# Patient Record
Sex: FEMALE | Race: WHITE | Employment: FULL TIME | URBAN - NONMETROPOLITAN AREA
[De-identification: names, ages, dates, MRNs, and addresses within clinical notes are randomized per-mention and may not be internally consistent; named-entity substitution may affect disease eponyms.]

---

## 2018-01-01 ENCOUNTER — NURSE TRIAGE (OUTPATIENT)
Dept: ADMINISTRATIVE | Age: 23
End: 2018-01-01

## 2019-01-27 ENCOUNTER — HOSPITAL ENCOUNTER (EMERGENCY)
Age: 24
Discharge: HOME | End: 2019-01-27
Attending: EMERGENCY MEDICINE
Payer: COMMERCIAL

## 2019-01-27 VITALS
HEIGHT: 64 IN | SYSTOLIC BLOOD PRESSURE: 90 MMHG | RESPIRATION RATE: 15 BRPM | HEART RATE: 78 BPM | BODY MASS INDEX: 19.63 KG/M2 | TEMPERATURE: 97.9 F | DIASTOLIC BLOOD PRESSURE: 70 MMHG | WEIGHT: 115 LBS | OXYGEN SATURATION: 98 %

## 2019-01-27 DIAGNOSIS — R68.89 FEELING OF BEING DRUGGED: Primary | ICD-10-CM

## 2019-01-27 PROCEDURE — 2709999900 HC NON-CHARGEABLE SUPPLY

## 2019-01-27 PROCEDURE — 99283 EMERGENCY DEPT VISIT LOW MDM: CPT

## 2019-01-27 ASSESSMENT — ENCOUNTER SYMPTOMS
BACK PAIN: 0
EYE DISCHARGE: 0
RHINORRHEA: 0
EYE PAIN: 0
DIARRHEA: 0
VOMITING: 0
WHEEZING: 0
COUGH: 0
ABDOMINAL PAIN: 0
NAUSEA: 1
SORE THROAT: 0
SHORTNESS OF BREATH: 0

## 2019-02-05 ENCOUNTER — NURSE TRIAGE (OUTPATIENT)
Dept: ADMINISTRATIVE | Age: 24
End: 2019-02-05

## 2019-04-03 ENCOUNTER — NURSE TRIAGE (OUTPATIENT)
Dept: ADMINISTRATIVE | Age: 24
End: 2019-04-03

## 2019-04-03 NOTE — TELEPHONE ENCOUNTER
Message from [facility] sent at 4/3/2019  1:16 PM EDT     Summary: stomach pain    Patient has had stomach pains for 4 days and and asking what she can do for them            Call History      Type Contact Phone User   04/03/2019 01:15 PM Phone [phone]) Esmer Valle [facility] [phone] (H) Aislinn Amaral

## 2019-04-03 NOTE — TELEPHONE ENCOUNTER
Reason for Disposition   [1] MODERATE pain (e.g., interferes with normal activities) AND [2] pain comes and goes (cramps) AND [3] present > 24 hours  (Exception: pain with Vomiting or Diarrhea - see that Guideline)    Answer Assessment - Initial Assessment Questions  1. LOCATION: \"Where does it hurt? \"       Below the navel, more in the center  2. RADIATION: \"Does the pain shoot anywhere else? \" (e.g., chest, back)      Back pain   3. ONSET: \"When did the pain begin? \" (e.g., minutes, hours or days ago)       Sunday   4. SUDDEN: \"Gradual or sudden onset? \"      gradual  5. PATTERN \"Does the pain come and go, or is it constant? \"     - If constant: \"Is it getting better, staying the same, or worsening? \"       (Note: Constant means the pain never goes away completely; most serious pain is constant and it progresses)      - If intermittent: \"How long does it last?\" \"Do you have pain now? \"      (Note: Intermittent means the pain goes away completely between bouts)      Comes and goes   6. SEVERITY: \"How bad is the pain? \"  (e.g., Scale 1-10; mild, moderate, or severe)    - MILD (1-3): doesn't interfere with normal activities, abdomen soft and not tender to touch     - MODERATE (4-7): interferes with normal activities or awakens from sleep, tender to touch     - SEVERE (8-10): excruciating pain, doubled over, unable to do any normal activities       5-6  7. RECURRENT SYMPTOM: \"Have you ever had this type of abdominal pain before? \" If so, ask: \"When was the last time? \" and \"What happened that time? \"      no  8. CAUSE: \"What do you think is causing the abdominal pain? \"     Not sure  9. RELIEVING/AGGRAVATING FACTORS: \"What makes it better or worse? \" (e.g., movement, antacids, bowel movement)      Worse when she eats a certain amount, bowel movement or gas   10. OTHER SYMPTOMS: \"Has there been any vomiting, diarrhea, constipation, or urine problems? \"       Nausea- can only eat small amount before she gets really sick to her stomach  11. PREGNANCY: \"Is there any chance you are pregnant? \" \"When was your last menstrual period? \"        On implant    Protocols used: ABDOMINAL PAIN - [facility]

## 2019-05-04 ENCOUNTER — NURSE TRIAGE (OUTPATIENT)
Dept: ADMINISTRATIVE | Age: 24
End: 2019-05-04

## 2019-05-04 NOTE — TELEPHONE ENCOUNTER
Reason for Disposition   [1] DOUBLE DOSE (an extra dose or lesser amount) of over-the-counter (OTC) drug AND [2] any symptoms (e.g., dizziness, nausea, pain, sleepiness)    Answer Assessment - Initial Assessment Questions  1. SYMPTOMS: \"Do you have any symptoms? \"      Yes- lightheadedness and nausea with one episode of vomiting, able to keep fluids down- was given Tramadol 50 mg and Motrin 800 mg for dental pain, was not aware that she was not to take them at the same time,  2. SEVERITY: If symptoms are present, ask \"Are they mild, moderate or severe? \"      Mild it seems now, Called poison control, Told her she should be fine but to continue to drink water to flush them out.     Protocols used: MEDICATION QUESTION CALL-ADULT-

## 2019-05-04 NOTE — TELEPHONE ENCOUNTER
Message from Mary Tavares sent at 5/4/2019  9:02 AM EDT     Summary: taking 2 pain pills together    Medication she was prescribed a couple days ago, has been taking 2 pain pills together, feels lightheaded, also gave poison control number            Call History      Type Contact Phone User   05/04/2019 08:58 AM Phone ([phone]) [place], [place] [phone] (H) Mary Tavares